# Patient Record
Sex: MALE | Race: WHITE | NOT HISPANIC OR LATINO | URBAN - METROPOLITAN AREA
[De-identification: names, ages, dates, MRNs, and addresses within clinical notes are randomized per-mention and may not be internally consistent; named-entity substitution may affect disease eponyms.]

---

## 2020-08-29 ENCOUNTER — INPATIENT (INPATIENT)
Facility: HOSPITAL | Age: 20
LOS: 10 days | Discharge: HOME | End: 2020-09-09
Attending: PSYCHIATRY & NEUROLOGY | Admitting: PSYCHIATRY & NEUROLOGY
Payer: COMMERCIAL

## 2020-08-29 VITALS
TEMPERATURE: 98 F | RESPIRATION RATE: 16 BRPM | HEART RATE: 78 BPM | OXYGEN SATURATION: 100 % | DIASTOLIC BLOOD PRESSURE: 81 MMHG | SYSTOLIC BLOOD PRESSURE: 129 MMHG

## 2020-08-29 DIAGNOSIS — F31.12 BIPOLAR DISORDER, CURRENT EPISODE MANIC WITHOUT PSYCHOTIC FEATURES, MODERATE: ICD-10-CM

## 2020-08-29 DIAGNOSIS — F31.13 BIPOLAR DISORDER, CURRENT EPISODE MANIC WITHOUT PSYCHOTIC FEATURES, SEVERE: ICD-10-CM

## 2020-08-29 LAB
ALBUMIN SERPL ELPH-MCNC: 4.6 G/DL — SIGNIFICANT CHANGE UP (ref 3.3–5)
ALP SERPL-CCNC: 74 U/L — SIGNIFICANT CHANGE UP (ref 40–120)
ALT FLD-CCNC: 38 U/L — SIGNIFICANT CHANGE UP (ref 4–41)
AMPHET UR-MCNC: NEGATIVE — SIGNIFICANT CHANGE UP
ANION GAP SERPL CALC-SCNC: 13 MMO/L — SIGNIFICANT CHANGE UP (ref 7–14)
APAP SERPL-MCNC: < 15 UG/ML — LOW (ref 15–25)
APPEARANCE UR: CLEAR — SIGNIFICANT CHANGE UP
AST SERPL-CCNC: 41 U/L — HIGH (ref 4–40)
BACTERIA # UR AUTO: HIGH
BARBITURATES UR SCN-MCNC: NEGATIVE — SIGNIFICANT CHANGE UP
BASOPHILS # BLD AUTO: 0.03 K/UL — SIGNIFICANT CHANGE UP (ref 0–0.2)
BASOPHILS NFR BLD AUTO: 0.5 % — SIGNIFICANT CHANGE UP (ref 0–2)
BENZODIAZ UR-MCNC: NEGATIVE — SIGNIFICANT CHANGE UP
BILIRUB SERPL-MCNC: 0.8 MG/DL — SIGNIFICANT CHANGE UP (ref 0.2–1.2)
BILIRUB UR-MCNC: NEGATIVE — SIGNIFICANT CHANGE UP
BLOOD UR QL VISUAL: NEGATIVE — SIGNIFICANT CHANGE UP
BUN SERPL-MCNC: 19 MG/DL — SIGNIFICANT CHANGE UP (ref 7–23)
CALCIUM SERPL-MCNC: 10.2 MG/DL — SIGNIFICANT CHANGE UP (ref 8.4–10.5)
CANNABINOIDS UR-MCNC: POSITIVE — SIGNIFICANT CHANGE UP
CHLORIDE SERPL-SCNC: 103 MMOL/L — SIGNIFICANT CHANGE UP (ref 98–107)
CO2 SERPL-SCNC: 22 MMOL/L — SIGNIFICANT CHANGE UP (ref 22–31)
COCAINE METAB.OTHER UR-MCNC: NEGATIVE — SIGNIFICANT CHANGE UP
COLOR SPEC: YELLOW — SIGNIFICANT CHANGE UP
CREAT SERPL-MCNC: 1.11 MG/DL — SIGNIFICANT CHANGE UP (ref 0.5–1.3)
EOSINOPHIL # BLD AUTO: 0.02 K/UL — SIGNIFICANT CHANGE UP (ref 0–0.5)
EOSINOPHIL NFR BLD AUTO: 0.3 % — SIGNIFICANT CHANGE UP (ref 0–6)
ETHANOL BLD-MCNC: < 10 MG/DL — SIGNIFICANT CHANGE UP
GLUCOSE SERPL-MCNC: 96 MG/DL — SIGNIFICANT CHANGE UP (ref 70–99)
GLUCOSE UR-MCNC: NEGATIVE — SIGNIFICANT CHANGE UP
HCT VFR BLD CALC: 46.7 % — SIGNIFICANT CHANGE UP (ref 39–50)
HGB BLD-MCNC: 15.8 G/DL — SIGNIFICANT CHANGE UP (ref 13–17)
IMM GRANULOCYTES NFR BLD AUTO: 0.3 % — SIGNIFICANT CHANGE UP (ref 0–1.5)
KETONES UR-MCNC: SIGNIFICANT CHANGE UP
LEUKOCYTE ESTERASE UR-ACNC: NEGATIVE — SIGNIFICANT CHANGE UP
LYMPHOCYTES # BLD AUTO: 1.71 K/UL — SIGNIFICANT CHANGE UP (ref 1–3.3)
LYMPHOCYTES # BLD AUTO: 27.9 % — SIGNIFICANT CHANGE UP (ref 13–44)
MCHC RBC-ENTMCNC: 29.8 PG — SIGNIFICANT CHANGE UP (ref 27–34)
MCHC RBC-ENTMCNC: 33.8 % — SIGNIFICANT CHANGE UP (ref 32–36)
MCV RBC AUTO: 88.1 FL — SIGNIFICANT CHANGE UP (ref 80–100)
METHADONE UR-MCNC: NEGATIVE — SIGNIFICANT CHANGE UP
MONOCYTES # BLD AUTO: 0.5 K/UL — SIGNIFICANT CHANGE UP (ref 0–0.9)
MONOCYTES NFR BLD AUTO: 8.2 % — SIGNIFICANT CHANGE UP (ref 2–14)
NEUTROPHILS # BLD AUTO: 3.84 K/UL — SIGNIFICANT CHANGE UP (ref 1.8–7.4)
NEUTROPHILS NFR BLD AUTO: 62.8 % — SIGNIFICANT CHANGE UP (ref 43–77)
NITRITE UR-MCNC: NEGATIVE — SIGNIFICANT CHANGE UP
NRBC # FLD: 0 K/UL — SIGNIFICANT CHANGE UP (ref 0–0)
OPIATES UR-MCNC: NEGATIVE — SIGNIFICANT CHANGE UP
OXYCODONE UR-MCNC: NEGATIVE — SIGNIFICANT CHANGE UP
PCP UR-MCNC: NEGATIVE — SIGNIFICANT CHANGE UP
PH UR: 6 — SIGNIFICANT CHANGE UP (ref 5–8)
PLATELET # BLD AUTO: 186 K/UL — SIGNIFICANT CHANGE UP (ref 150–400)
PMV BLD: 10.9 FL — SIGNIFICANT CHANGE UP (ref 7–13)
POTASSIUM SERPL-MCNC: 4.8 MMOL/L — SIGNIFICANT CHANGE UP (ref 3.5–5.3)
POTASSIUM SERPL-SCNC: 4.8 MMOL/L — SIGNIFICANT CHANGE UP (ref 3.5–5.3)
PROT SERPL-MCNC: 7.1 G/DL — SIGNIFICANT CHANGE UP (ref 6–8.3)
PROT UR-MCNC: SIGNIFICANT CHANGE UP
RBC # BLD: 5.3 M/UL — SIGNIFICANT CHANGE UP (ref 4.2–5.8)
RBC # FLD: 13.7 % — SIGNIFICANT CHANGE UP (ref 10.3–14.5)
RBC CASTS # UR COMP ASSIST: SIGNIFICANT CHANGE UP (ref 0–?)
SALICYLATES SERPL-MCNC: < 5 MG/DL — LOW (ref 15–30)
SARS-COV-2 RNA SPEC QL NAA+PROBE: SIGNIFICANT CHANGE UP
SODIUM SERPL-SCNC: 138 MMOL/L — SIGNIFICANT CHANGE UP (ref 135–145)
SP GR SPEC: 1.04 — SIGNIFICANT CHANGE UP (ref 1–1.04)
TSH SERPL-MCNC: 1.14 UIU/ML — SIGNIFICANT CHANGE UP (ref 0.27–4.2)
UROBILINOGEN FLD QL: SIGNIFICANT CHANGE UP
WBC # BLD: 6.12 K/UL — SIGNIFICANT CHANGE UP (ref 3.8–10.5)
WBC # FLD AUTO: 6.12 K/UL — SIGNIFICANT CHANGE UP (ref 3.8–10.5)
WBC UR QL: SIGNIFICANT CHANGE UP (ref 0–?)

## 2020-08-29 PROCEDURE — 99285 EMERGENCY DEPT VISIT HI MDM: CPT

## 2020-08-29 PROCEDURE — 70450 CT HEAD/BRAIN W/O DYE: CPT | Mod: 26

## 2020-08-29 RX ORDER — HYDROXYZINE HCL 10 MG
25 TABLET ORAL EVERY 6 HOURS
Refills: 0 | Status: DISCONTINUED | OUTPATIENT
Start: 2020-08-29 | End: 2020-09-09

## 2020-08-29 RX ORDER — CLONAZEPAM 1 MG
1 TABLET ORAL AT BEDTIME
Refills: 0 | Status: DISCONTINUED | OUTPATIENT
Start: 2020-08-29 | End: 2020-09-01

## 2020-08-29 RX ORDER — CLONAZEPAM 1 MG
1 TABLET ORAL AT BEDTIME
Refills: 0 | Status: DISCONTINUED | OUTPATIENT
Start: 2020-08-29 | End: 2020-08-29

## 2020-08-29 RX ORDER — HALOPERIDOL DECANOATE 100 MG/ML
5 INJECTION INTRAMUSCULAR ONCE
Refills: 0 | Status: DISCONTINUED | OUTPATIENT
Start: 2020-08-29 | End: 2020-08-31

## 2020-08-29 RX ORDER — LITHIUM CARBONATE 300 MG/1
600 TABLET, EXTENDED RELEASE ORAL AT BEDTIME
Refills: 0 | Status: DISCONTINUED | OUTPATIENT
Start: 2020-08-29 | End: 2020-09-03

## 2020-08-29 RX ORDER — DIPHENHYDRAMINE HCL 50 MG
50 CAPSULE ORAL EVERY 6 HOURS
Refills: 0 | Status: DISCONTINUED | OUTPATIENT
Start: 2020-08-29 | End: 2020-08-31

## 2020-08-29 RX ORDER — HALOPERIDOL DECANOATE 100 MG/ML
5 INJECTION INTRAMUSCULAR EVERY 6 HOURS
Refills: 0 | Status: DISCONTINUED | OUTPATIENT
Start: 2020-08-29 | End: 2020-08-31

## 2020-08-29 RX ORDER — ACETAMINOPHEN 500 MG
650 TABLET ORAL EVERY 6 HOURS
Refills: 0 | Status: DISCONTINUED | OUTPATIENT
Start: 2020-08-29 | End: 2020-09-09

## 2020-08-29 RX ORDER — DIPHENHYDRAMINE HCL 50 MG
50 CAPSULE ORAL ONCE
Refills: 0 | Status: DISCONTINUED | OUTPATIENT
Start: 2020-08-29 | End: 2020-08-31

## 2020-08-29 RX ADMIN — HALOPERIDOL DECANOATE 5 MILLIGRAM(S): 100 INJECTION INTRAMUSCULAR at 19:52

## 2020-08-29 RX ADMIN — LITHIUM CARBONATE 600 MILLIGRAM(S): 300 TABLET, EXTENDED RELEASE ORAL at 20:16

## 2020-08-29 RX ADMIN — Medication 2 MILLIGRAM(S): at 19:52

## 2020-08-29 RX ADMIN — Medication 50 MILLIGRAM(S): at 19:52

## 2020-08-29 NOTE — ED ADULT NURSE NOTE - NSIMPLEMENTINTERV_GEN_ALL_ED
Implemented All Fall with Harm Risk Interventions:  [place] to call system. Call bell, personal items and telephone within reach. Instruct patient to call for assistance. Room bathroom lighting operational. Non-slip footwear when patient is off stretcher. Physically safe environment: no spills, clutter or unnecessary equipment. Stretcher in lowest position, wheels locked, appropriate side rails in place. Provide visual cue, wrist band, yellow gown, etc. Monitor gait and stability. Monitor for mental status changes and reorient to person, place, and time. Review medications for side effects contributing to fall risk. Reinforce activity limits and safety measures with patient and family. Provide visual clues: red socks.

## 2020-08-29 NOTE — ED BEHAVIORAL HEALTH ASSESSMENT NOTE - DESCRIPTION (FIRST USE, LAST USE, QUANTITY, FREQUENCY, DURATION)
vapes nicotine nightly pt reports using alcohol since the age of 16 uses THC intermittently, last used one month ago pt reports using alcohol since the age of 16, drinks 3-4 drinks 3x per week, last used on vacation nearly one week ago

## 2020-08-29 NOTE — ED ADULT NURSE NOTE - OBJECTIVE STATEMENT
Received pt in  pt bought in for manic behaviour denies si/hi/avh presently safety & comfort measures maintained eval on going.

## 2020-08-29 NOTE — ED BEHAVIORAL HEALTH ASSESSMENT NOTE - RISK ASSESSMENT
Low Acute Suicide Risk Pt at elevated imminent risk of inadvertantly harming self/others at this time due to his active abdiel. He denies current SI/HI, but currently has grandiose, tangential thoughts leading to engaging in unusual goal-directed behavior with limited insight into need for immediate treatment of his condition. Pt has erratic sleep with changes in eating patterns with increased risk of impulsivity due to the nature of his abdiel. Pt needs involuntary hospitalization at this time for stabilization of his acute abdiel and heightened risk of harm. Release to an outpatient setting is not appropriate at this time as follow up for his acute abdiel could not be assured despite supportive and available family. Pt's chronic risk factors include his active and history of substance use, ongoing legal issues, his lack of insight into need for treatment, recent psychosocial stressors including family illness and family fight. Protective factors include lack of access to means, lack of previous psych history/hospitalization, lack of previous SI/HI, future orientation, engagement with school, employment/residential/relationship stability, supportive family.

## 2020-08-29 NOTE — ED PROVIDER NOTE - PHYSICAL EXAMINATION
ATTENDING PHYSICAL EXAM  GEN - NAD; well appearing; A+O x3  HEAD - NC/AT; EYES/NOSE - PERRL, EOMI, mucous membranes moist, no discharge; THROAT: Oral cavity and pharynx normal. No inflammation, swelling, exudate, or lesions  NECK: Neck supple, non-tender without lymphadenopathy, no masses, no JVD  PULMONARY - CTA b/l, symmetric breath sounds  CARDIAC -s1s2, RRR, no M,R,G  ABDOMEN - +BS, ND, NT, soft, no guarding, no rebound, no masses   BACK - no CVA tenderness, No vertebral or paravertebral tenderness  EXTREMITIES - symmetric pulses, 2+ dp, capillary refill < 2 seconds, no clubbing, no cyanosis, no edema  SKIN - no rash or bruising   NEUROLOGIC - alert, CN 2-12 intact, sensation nl, coordination nl, finger to nose nl, motor 5/5 RUE/LUE/RLE/LLE/EHL/Plantar flexion, no pronator drift, gait nl  PSYCH - Rapid speech with flight of ideas.

## 2020-08-29 NOTE — ED BEHAVIORAL HEALTH ASSESSMENT NOTE - DESCRIPTION
Pt took off clothes, did push ups in BH room but has been in behavioral control without need for emergent medication. VSS    Vital Signs Last 24 Hrs  T(C): 36.9 (29 Aug 2020 11:30), Max: 36.9 (29 Aug 2020 11:30)  T(F): 98.4 (29 Aug 2020 11:30), Max: 98.4 (29 Aug 2020 11:30)  HR: 78 (29 Aug 2020 11:30) (78 - 78)  BP: 129/81 (29 Aug 2020 11:30) (129/81 - 129/81)  BP(mean): --  RR: 16 (29 Aug 2020 11:30) (16 - 16)  SpO2: 100% (29 Aug 2020 11:30) (100% - 100%) denies works as a , currently dating his high school sweetcjrt, attends [employer]

## 2020-08-29 NOTE — ED BEHAVIORAL HEALTH ASSESSMENT NOTE - SUBSTANCE ISSUES AND PLAN (INCLUDE STANDING AND PRN MEDICATION)
pt with alcohol use, will start symptom triggered CIWA, folic acid and thiamine pt with intermittent alcohol use, last used one week ago, no CIWA necessary at this time

## 2020-08-29 NOTE — ED ADULT TRIAGE NOTE - CHIEF COMPLAINT QUOTE
mom reports pts " mind racing,asking same question repetitive.fit md to evaluate. luis a mansfield,. mom reports pts " mind racing,asking same question repetitive.fit md to evaluate. luis a mansfield,. pt speaking non stop at triage. states " hears mom  and boss calling me asshole'. . pt alert,oriented. last vaped 7/11. last alcohol 8/11

## 2020-08-29 NOTE — ED BEHAVIORAL HEALTH NOTE - BEHAVIORAL HEALTH NOTE
Pt unable to answer COVID screening questions due to tangential speech.    Collateral history obtained from patient's mother Malissa Mireles ([phone]) - Notes that the family came home from [facility] last week and since then the pt has been biking, playing sports and seemed "fine" until Wednesday. Mom reports that the patient added a liquid to his drink on Wednesday which the patient said was liquid caffeine and on Thursday, she and her  noticed that Faustino was "more hyper than usual." On Thursday, the patient complained that people at his job were accusing him of being on drugs and seemed upset about this. On Friday, Malissa noted that his thought process did not make sense, repeating himself and reminiscing about events from his childhood. Pt also ruminating about his DUI and probation status in addition to his recently ill grandmother while becoming increasingly tearful and emotional. She spoke with a family friend who is a therapist and mentioned that he sounded manic, so she attempted to take him to an urgent care that was closed before bringing him to the [facility] ER. No PMHx. Allergic to cefcil. No previous psych history/diagnosis/meds/hospitalization, but was mandated to substance use counseling in [place]. Denies recent drug use, utilized some alcohol on vacation. Denies current/past SI/HI. No access to weapons. Sleep erratic for the past week (approximately four hours per night with early awakening), believes he is eating less. ADLs intact. Works as . Pt's parents not amenable to hospitalization at this time, they were told prior to coming to the ED that he would only receive medication and they would like to take him home, saying they can monitor him at home 24/7. [facility] [place] [facility] provided to parents.    COVID Exposure Screen- Collateral (i.e. third-party, chart review, belongings, etc; include EMS and ED staff)    1.	*In the past 14 days, has the patient been around anyone with a positive COVID-19 test?*   (  ) Yes   ( x ) No   (  ) Unknown- Reason (e.g. collateral uncertain, refusing to answer, etc.):  ______  IF YES PROCEED TO QUESTION #2. IF NO or UNKNOWN, PLEASE SKIP TO QUESTION #7  2.	Was the patient within 6 feet of them for at least 15 minutes? (  ) Yes   (  ) No   (  ) Unknown- Reason: ______    3.	Did the patient provided care for them? (  ) Yes   (  ) No   (  ) Unknown- Reason: ______    4.	Did the patient have direct physical contact with them (touched, hugged, or kissed them)?   (  ) Yes   (  ) No    (  ) Unknown- Reason: ______    5.	Did the patient share eating or drinking utensils with them? (  ) Yes   (  ) No    (  ) Unknown- Reason: ______    6.	Have they sneezed, coughed, or somehow got respiratory droplets on the patient? (  ) Yes   (  ) No    (  ) Unknown- Reason: ______      7.	*Has the patient been out of [place] within the past 14 days?*  (  ) Yes   ( x ) No   (  ) Unknown- Reason (e.g. collateral uncertain, refusing to answer, etc.): _______  IF YES PLEASE ANSWER THE FOLLOWING QUESTIONS:  8.	Which state/country has the patient been to? ______   9.	Was the patient there over 24 hours? (  ) Yes   (  ) No    (  ) Unknown- Reason: ______    10.	What date did the patient return to [facility]? ______

## 2020-08-29 NOTE — ED BEHAVIORAL HEALTH ASSESSMENT NOTE - HPI (INCLUDE ILLNESS QUALITY, SEVERITY, DURATION, TIMING, CONTEXT, MODIFYING FACTORS, ASSOCIATED SIGNS AND SYMPTOMS)
Mr. Mireles is a 19 y/o man, domiciled with family, employed as a , no PPHx, no PMHx, susbtance use history significant for alcohol use (DUI in 2019), intermittent marijuana use, no trauma history who was BIB parents for several days of manic symptoms.    On interview, pt was initially sleeping, but then woke up and cooperated with the examination. He was frequently tangential and illogical in his answers to questioning, but was easy to interrupt and required constant redirection to obtain historical information. Pt states that two days ago, he was at home when he put a "green gummy bear" and a "white gummy bear" (which are his two favorite flavors) on each side of his cheek for "the flavor" and when he love the kitchen and ran into his parents, they accused him of being on cocaine and got into a heated argument. He states that they have been persistently accusing him of using drugs and alcohol for several weeks, which has led to arguments and precipitated him coming here so that they could be satisfied that he was ok. He notes that he has been "chasing the dream" for several weeks while attempting to train in multiple sports. 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 He denies changes in appetite and reports that he continues to work without difficulty, but admits that he feels as though his "mind is racing ahead of myself" which is something his brother experienced when his brother was younger. At this point, pt becomes tearful, stating his parents have been yelling constantly that he is doing drugs, although he denies frequent drug use. He states he has been hiding his "true, happy self" from them since he was 13 and just wishes that his entire family would know he is a happy person. Despite these statements, he reports that he feels he has been depressed every day since the age of 13, but does not go on to describe any persistent symptoms consistent with depression. He denies AH/VH, but believes music has messaging especially for him, going on a tangential train of thought about his favorite artist "[employer]" dying which upset him because when he plays sports, he can automatically sing many songs while playing, which he believes is because the music is made for him. Pt admits to brief counseling in school several months ago, but otherwise denies all other past psychiatric care. Pt denies current/past SI/HI and denies that any fights with family became physical. Pt unable to provide answers to COVID screening questions and when asked, becomes tangential, stating people he was with on vacation have gone with him on vacation for 20 years, since they were babies and that people with young kids may be exposed to covid due to their age. Pt believes he needs adderall for his current symptoms, but denies need for hospitalization at this time, stating he feels well but it is his family who thinks he is not well.    Please see BH note for collateral info.

## 2020-08-29 NOTE — ED BEHAVIORAL HEALTH ASSESSMENT NOTE - OTHER PAST PSYCHIATRIC HISTORY (INCLUDE DETAILS REGARDING ONSET, COURSE OF ILLNESS, INPATIENT/OUTPATIENT TREATMENT)
saw therapist for counseling for several months at college at end of last year. No other known previous diagnoses/treatments/hospitalizations/suicidality.

## 2020-08-29 NOTE — ED BEHAVIORAL HEALTH ASSESSMENT NOTE - NS ED BHA PLAN ADMIT TO PSYCHIATRY BH CONTACT YN
would be needed. I spent 15 minutes with the patient, over 50% of the time was spent towards patient counseling regarding the GeneSight results and possible causes of her anxiety. Return in about 6 months (around 7/2/2020) for Mood disorder. No

## 2020-08-29 NOTE — ED ADULT NURSE NOTE - CHIEF COMPLAINT QUOTE
mom reports pts " mind racing,asking same question repetitive.fit md to evaluate. luis a mansfield,. pt speaking non stop at triage. states " hears mom  and boss calling me asshole'. . pt alert,oriented. last vaped 7/11. last alcohol 8/11

## 2020-08-29 NOTE — ED BEHAVIORAL HEALTH ASSESSMENT NOTE - CASE SUMMARY
This is a 21 y/o man BIB family who is suffering from symptoms of acute abdiel for several days. Pt is labile with racing thoughts with tangential, flight of ideas with recent changes in sleep, as well as grandiose ideas of becoming a professional sports star leading to alterations in behavior.  Collateral suggests symptoms have been ongoing for 3 days, although pt indicates symptoms may have been ongoing for several weeks.  Pt does not show overt evidence of active psychosis as he denies AH/VH without overt delusions, but does reference some IOR in music he listens to, which may be a result of his heightened grandiose thinking.  The patient's active symptoms meet criteria for a current manic episode without psychotic features, which can be caused by bipolar disorder.  It is unclear at this time whether the patient's substance use is contributory to his acute abdiel, but pt's utox was positive for cannabinoids and pt admits to drinking approximately one week ago.  Although the patient is not actively suicidal or homicidal, his acute manic state with illogical thought process, puts patient at a heightened risk for impulsivity.  In the setting of his acute abdiel and lack of insight into need for stabilization at this time the patient requires involuntary hospitalization for his safety given the high risk the patient has for inadvertantly harming himself or others while acutely manic.  Admit to [address] on a 9.39 status, involuntary legal status.  No indication for constant observation in a locked, supervised setting.  Recommend EMS transport to unit with buckle guard for safety.

## 2020-08-29 NOTE — ED BEHAVIORAL HEALTH ASSESSMENT NOTE - SUICIDE PROTECTIVE FACTORS
Identifies reasons for living/Supportive social network of family or friends/Responsibility to family and others/Engaged in work or school

## 2020-08-29 NOTE — ED BEHAVIORAL HEALTH ASSESSMENT NOTE - SUMMARY
Mr. Mireles is a 21 y/o man BIB family who is suffering from symptoms of acute abdiel for several days. Pt is labile with racing thoughts with tangential, flight of idea speech with recent changes in sleep, as well as grandiose ideas of becoming a professional sports star leading to alterations in behavior. Collateral suggests symptoms have been ongoing for 3 days, although pt indicates symptoms may have been ongoing for several weeks. Pt does not show overt evidence of active psychosis as he denies AH/VH without overt delusions, but does reference some IOR in music he listens to, which may be a result of his heightened grandiose thinking. The patient's active symptoms meet criteria for a current manic episode without psychotic features, which can be caused by bipolar disorder. It is unclear at this time whether the patient's substance use is contributory to his acute abdiel, but pt's utox was positive for cannabnoids and pt admits to drinking approximately one week ago. Although the patient is not actively suicidal or homicidal, his acute manic state with illogical thought process, heightened risk for impulsivity in the setting of his acute abdiel and lack of insight into need for stabilization at this time necessitate the patient's involuntary hospitalization for his safety given the high risk the patient has for inadvertantly harming himself or others while actuely manic. Please see below for additional safety assessment.    Plan:  -Admit to [facility] on 9.39 status. Pt's family initially objecting, but extensive psychoed was provided regarding need for treatment/medication at this time. No 1:1 necessary, pt not expressing SI/HI at this time  -Will start mood stabilizer for patient's active abdiel. Pt not actively psychotic, will not consider antipsychotic medication at this time  -Klonopin 1 mg qHS prn for insomnia, additional 0.5 mg prn BID for day time anxiety  -Haldol 5mg/ativan 2 mg/benadryl 50 mg q6h prn PO/IM for acute agitation  -Medical: tylenol prns for pain  -Substance - pt with history of intermittent alcohol use, last drank over one week ago, pt without active symptoms of withdrawal, VSS, no need for CIWA at this time Mr. Mireles is a 21 y/o man BIB family who is suffering from symptoms of acute abdiel for several days. Pt is labile with racing thoughts with tangential, flight of idea speech with recent changes in sleep, as well as grandiose ideas of becoming a professional sports star leading to alterations in behavior. Collateral suggests symptoms have been ongoing for 3 days, although pt indicates symptoms may have been ongoing for several weeks. Pt does not show overt evidence of active psychosis as he denies AH/VH without overt delusions, but does reference some IOR in music he listens to, which may be a result of his heightened grandiose thinking. The patient's active symptoms meet criteria for a current manic episode without psychotic features, which can be caused by bipolar disorder. It is unclear at this time whether the patient's substance use is contributory to his acute abdiel, but pt's utox was positive for cannabnoids and pt admits to drinking approximately one week ago. Although the patient is not actively suicidal or homicidal, his acute manic state with illogical thought process, heightened risk for impulsivity in the setting of his acute abdiel and lack of insight into need for stabilization at this time necessitate the patient's involuntary hospitalization for his safety given the high risk the patient has for inadvertantly harming himself or others while actuely manic. Please see below for additional safety assessment.    Plan:  -Admit to [facility] [place] on 9.39 status. Pt's family initially objecting, but extensive psychoed was provided regarding need for treatment/medication at this time. No 1:1 necessary, pt not expressing SI/HI at this time  -Will start mood stabilizer for patient's active abdiel. Pt not actively psychotic, will not consider antipsychotic medication at this time  -Klonopin 1 mg qHS prn for insomnia, atarax 25 mg q6h prn PO for anxiety. Would recommend minimizing benzos when possible due to pt's substance use history.  -Haldol 5mg/ativan 2 mg/benadryl 50 mg q6h prn PO/IM for acute agitation  -Medical: tylenol prns for pain  -Substance - pt with history of intermittent alcohol use, last drank over one week ago, pt without active symptoms of withdrawal, VSS, no need for CIWA at this time Mr. Mireles is a 21 y/o man BIB family who is suffering from symptoms of acute abdiel for several days. Pt is labile with racing thoughts with tangential, flight of idea speech with recent changes in sleep, as well as grandiose ideas of becoming a professional sports star leading to alterations in behavior. Collateral suggests symptoms have been ongoing for 3 days, although pt indicates symptoms may have been ongoing for several weeks. Pt does not show overt evidence of active psychosis as he denies AH/VH without overt delusions, but does reference some IOR in music he listens to, which may be a result of his heightened grandiose thinking. The patient's active symptoms meet criteria for a current manic episode without psychotic features, which can be caused by bipolar disorder. It is unclear at this time whether the patient's substance use is contributory to his acute abdiel, but pt's utox was positive for cannabnoids and pt admits to drinking approximately one week ago. Although the patient is not actively suicidal or homicidal, his acute manic state with illogical thought process, heightened risk for impulsivity in the setting of his acute abdiel and lack of insight into need for stabilization at this time necessitate the patient's involuntary hospitalization for his safety given the high risk the patient has for inadvertantly harming himself or others while actuely manic. Please see below for additional safety assessment.    Plan:  -Admit to [facility] on 9.39 status. Pt's family initially objecting, but extensive psychoed was provided regarding need for treatment/medication at this time. No 1:1 necessary, pt not expressing SI/HI at this time  -Will initiate lithobid 600 mg qHS for mood stabilization. Pt not actively psychotic, will not consider antipsychotic medication at this time  -Klonopin 1 mg qHS prn for insomnia, atarax 25 mg q6h prn PO for anxiety. Would recommend minimizing benzos when possible due to pt's substance use history.  -Haldol 5mg/ativan 2 mg/benadryl 50 mg q6h prn PO/IM for acute agitation  -Medical: tylenol prns for pain  -Substance - pt with history of intermittent alcohol use, last drank over one week ago, pt without active symptoms of withdrawal, VSS, no need for CIWA at this time Mr. Mireles is a 21 y/o man BIB family who is suffering from symptoms of acute abdiel for several days. Pt is labile with racing thoughts with tangential, flight of idea speech with recent changes in sleep, as well as grandiose ideas of becoming a professional sports star leading to alterations in behavior. Collateral suggests symptoms have been ongoing for 3 days, although pt indicates symptoms may have been ongoing for several weeks. Pt does not show overt evidence of active psychosis as he denies AH/VH without overt delusions, but does reference some IOR in music he listens to, which may be a result of his heightened grandiose thinking. The patient's active symptoms meet criteria for a current manic episode without psychotic features, which can be caused by bipolar disorder. It is unclear at this time whether the patient's substance use is contributory to his acute abdiel, but pt's utox was positive for cannabinoids and pt admits to drinking approximately one week ago. Although the patient is not actively suicidal or homicidal, his acute manic state with illogical thought process, heightened risk for impulsivity in the setting of his acute abdiel and lack of insight into need for stabilization at this time necessitate the patient's involuntary hospitalization for his safety given the high risk the patient has for inadvertantly harming himself or others while acutely manic. Please see below for additional safety assessment.    Plan:  -Admit to [facility] [place] on 9.39 status. Pt's family initially objecting, but extensive psychoed was provided regarding need for treatment/medication at this time. No 1:1 necessary, pt not expressing SI/HI at this time  -Will initiate lithobid 600 mg qHS for mood stabilization. Pt not actively psychotic, will not consider antipsychotic medication at this time  -Klonopin 1 mg qHS prn for insomnia, atarax 25 mg q6h prn PO for anxiety. Would recommend minimizing benzos when possible due to pt's substance use history.  -Haldol 5mg/ativan 2 mg/benadryl 50 mg q6h prn PO/IM for acute agitation  -Medical: tylenol prns for pain  -Substance - pt with history of intermittent alcohol use, last drank over one week ago, pt without active symptoms of withdrawal, VSS, no need for CIWA at this time

## 2020-08-29 NOTE — ED PROVIDER NOTE - CLINICAL SUMMARY MEDICAL DECISION MAKING FREE TEXT BOX
21 y/o M with apparent  first manic episode.  Check labs, tox, TSH, ECG, head CT.  Psychiatrist tiffany.

## 2020-08-29 NOTE — ED BEHAVIORAL HEALTH ASSESSMENT NOTE - DETAILS
none known pt's brother with panic attacks parents informed, ED attending informed [facility] Dr. Klein informed

## 2020-08-30 PROCEDURE — 99222 1ST HOSP IP/OBS MODERATE 55: CPT

## 2020-08-30 RX ADMIN — Medication 2 MILLIGRAM(S): at 20:37

## 2020-08-30 RX ADMIN — LITHIUM CARBONATE 600 MILLIGRAM(S): 300 TABLET, EXTENDED RELEASE ORAL at 20:37

## 2020-08-30 RX ADMIN — HALOPERIDOL DECANOATE 5 MILLIGRAM(S): 100 INJECTION INTRAMUSCULAR at 08:33

## 2020-08-30 RX ADMIN — HALOPERIDOL DECANOATE 5 MILLIGRAM(S): 100 INJECTION INTRAMUSCULAR at 20:37

## 2020-08-30 RX ADMIN — Medication 50 MILLIGRAM(S): at 08:33

## 2020-08-30 RX ADMIN — Medication 50 MILLIGRAM(S): at 20:37

## 2020-08-30 RX ADMIN — Medication 2 MILLIGRAM(S): at 08:33

## 2020-08-31 LAB
SARS-COV-2 IGG SERPL IA-ACNC: 0.4 RATIO — SIGNIFICANT CHANGE UP
SARS-COV-2 IGG SERPL QL IA: NEGATIVE — SIGNIFICANT CHANGE UP
SARS-COV-2 IGG SERPL QL IA: NEGATIVE — SIGNIFICANT CHANGE UP
SARS-COV-2 IGM SERPL IA-ACNC: <0.2 RATIO — SIGNIFICANT CHANGE UP

## 2020-08-31 PROCEDURE — 99232 SBSQ HOSP IP/OBS MODERATE 35: CPT | Mod: GC

## 2020-08-31 RX ORDER — OLANZAPINE 15 MG/1
5 TABLET, FILM COATED ORAL ONCE
Refills: 0 | Status: DISCONTINUED | OUTPATIENT
Start: 2020-08-31 | End: 2020-09-09

## 2020-08-31 RX ORDER — OLANZAPINE 15 MG/1
5 TABLET, FILM COATED ORAL EVERY 6 HOURS
Refills: 0 | Status: DISCONTINUED | OUTPATIENT
Start: 2020-08-31 | End: 2020-09-09

## 2020-08-31 RX ADMIN — OLANZAPINE 5 MILLIGRAM(S): 15 TABLET, FILM COATED ORAL at 10:29

## 2020-08-31 RX ADMIN — OLANZAPINE 5 MILLIGRAM(S): 15 TABLET, FILM COATED ORAL at 17:16

## 2020-08-31 RX ADMIN — Medication 2 MILLIGRAM(S): at 20:13

## 2020-08-31 RX ADMIN — LITHIUM CARBONATE 600 MILLIGRAM(S): 300 TABLET, EXTENDED RELEASE ORAL at 22:26

## 2020-09-01 PROCEDURE — 99232 SBSQ HOSP IP/OBS MODERATE 35: CPT

## 2020-09-01 RX ORDER — OLANZAPINE 15 MG/1
5 TABLET, FILM COATED ORAL AT BEDTIME
Refills: 0 | Status: DISCONTINUED | OUTPATIENT
Start: 2020-09-01 | End: 2020-09-02

## 2020-09-01 RX ORDER — CLONAZEPAM 1 MG
1 TABLET ORAL AT BEDTIME
Refills: 0 | Status: DISCONTINUED | OUTPATIENT
Start: 2020-09-01 | End: 2020-09-08

## 2020-09-01 RX ORDER — NICOTINE POLACRILEX 2 MG
4 GUM BUCCAL
Refills: 0 | Status: DISCONTINUED | OUTPATIENT
Start: 2020-09-01 | End: 2020-09-09

## 2020-09-01 RX ADMIN — LITHIUM CARBONATE 600 MILLIGRAM(S): 300 TABLET, EXTENDED RELEASE ORAL at 21:04

## 2020-09-01 RX ADMIN — Medication 2 MILLIGRAM(S): at 20:53

## 2020-09-01 RX ADMIN — Medication 1 MILLIGRAM(S): at 21:55

## 2020-09-01 RX ADMIN — Medication 25 MILLIGRAM(S): at 21:55

## 2020-09-01 RX ADMIN — OLANZAPINE 5 MILLIGRAM(S): 15 TABLET, FILM COATED ORAL at 21:04

## 2020-09-02 PROCEDURE — 99232 SBSQ HOSP IP/OBS MODERATE 35: CPT

## 2020-09-02 RX ORDER — OLANZAPINE 15 MG/1
10 TABLET, FILM COATED ORAL AT BEDTIME
Refills: 0 | Status: DISCONTINUED | OUTPATIENT
Start: 2020-09-02 | End: 2020-09-03

## 2020-09-02 RX ADMIN — Medication 1 MILLIGRAM(S): at 20:40

## 2020-09-02 RX ADMIN — LITHIUM CARBONATE 600 MILLIGRAM(S): 300 TABLET, EXTENDED RELEASE ORAL at 20:40

## 2020-09-02 RX ADMIN — Medication 4 MILLIGRAM(S): at 10:00

## 2020-09-02 RX ADMIN — Medication 4 MILLIGRAM(S): at 12:10

## 2020-09-02 RX ADMIN — OLANZAPINE 10 MILLIGRAM(S): 15 TABLET, FILM COATED ORAL at 20:40

## 2020-09-03 LAB — LITHIUM SERPL-MCNC: 0.43 MMOL/L — LOW (ref 0.6–1.2)

## 2020-09-03 PROCEDURE — 99232 SBSQ HOSP IP/OBS MODERATE 35: CPT | Mod: GC

## 2020-09-03 RX ORDER — LITHIUM CARBONATE 300 MG/1
900 TABLET, EXTENDED RELEASE ORAL AT BEDTIME
Refills: 0 | Status: DISCONTINUED | OUTPATIENT
Start: 2020-09-03 | End: 2020-09-09

## 2020-09-03 RX ORDER — OLANZAPINE 15 MG/1
15 TABLET, FILM COATED ORAL AT BEDTIME
Refills: 0 | Status: DISCONTINUED | OUTPATIENT
Start: 2020-09-03 | End: 2020-09-09

## 2020-09-03 RX ADMIN — LITHIUM CARBONATE 900 MILLIGRAM(S): 300 TABLET, EXTENDED RELEASE ORAL at 20:51

## 2020-09-03 RX ADMIN — OLANZAPINE 5 MILLIGRAM(S): 15 TABLET, FILM COATED ORAL at 16:45

## 2020-09-03 RX ADMIN — Medication 2 MILLIGRAM(S): at 05:45

## 2020-09-03 RX ADMIN — OLANZAPINE 15 MILLIGRAM(S): 15 TABLET, FILM COATED ORAL at 20:50

## 2020-09-03 RX ADMIN — Medication 2 MILLIGRAM(S): at 20:50

## 2020-09-04 PROCEDURE — 99232 SBSQ HOSP IP/OBS MODERATE 35: CPT

## 2020-09-04 RX ADMIN — OLANZAPINE 15 MILLIGRAM(S): 15 TABLET, FILM COATED ORAL at 20:07

## 2020-09-04 RX ADMIN — Medication 1 MILLIGRAM(S): at 20:09

## 2020-09-04 RX ADMIN — Medication 4 MILLIGRAM(S): at 09:29

## 2020-09-04 RX ADMIN — Medication 2 MILLIGRAM(S): at 16:08

## 2020-09-04 RX ADMIN — LITHIUM CARBONATE 900 MILLIGRAM(S): 300 TABLET, EXTENDED RELEASE ORAL at 20:06

## 2020-09-05 RX ADMIN — LITHIUM CARBONATE 900 MILLIGRAM(S): 300 TABLET, EXTENDED RELEASE ORAL at 20:39

## 2020-09-05 RX ADMIN — Medication 4 MILLIGRAM(S): at 14:53

## 2020-09-05 RX ADMIN — Medication 1 MILLIGRAM(S): at 20:40

## 2020-09-05 RX ADMIN — OLANZAPINE 15 MILLIGRAM(S): 15 TABLET, FILM COATED ORAL at 20:39

## 2020-09-05 RX ADMIN — Medication 2 MILLIGRAM(S): at 12:35

## 2020-09-06 RX ADMIN — LITHIUM CARBONATE 900 MILLIGRAM(S): 300 TABLET, EXTENDED RELEASE ORAL at 20:57

## 2020-09-06 RX ADMIN — Medication 25 MILLIGRAM(S): at 12:56

## 2020-09-06 RX ADMIN — Medication 25 MILLIGRAM(S): at 22:00

## 2020-09-06 RX ADMIN — OLANZAPINE 15 MILLIGRAM(S): 15 TABLET, FILM COATED ORAL at 20:57

## 2020-09-07 RX ADMIN — Medication 25 MILLIGRAM(S): at 13:44

## 2020-09-07 RX ADMIN — Medication 1 MILLIGRAM(S): at 20:45

## 2020-09-07 RX ADMIN — OLANZAPINE 15 MILLIGRAM(S): 15 TABLET, FILM COATED ORAL at 20:45

## 2020-09-07 RX ADMIN — LITHIUM CARBONATE 900 MILLIGRAM(S): 300 TABLET, EXTENDED RELEASE ORAL at 20:45

## 2020-09-08 LAB
ANION GAP SERPL CALC-SCNC: 10 MMO/L — SIGNIFICANT CHANGE UP (ref 7–14)
BUN SERPL-MCNC: 19 MG/DL — SIGNIFICANT CHANGE UP (ref 7–23)
CALCIUM SERPL-MCNC: 9.8 MG/DL — SIGNIFICANT CHANGE UP (ref 8.4–10.5)
CHLORIDE SERPL-SCNC: 103 MMOL/L — SIGNIFICANT CHANGE UP (ref 98–107)
CO2 SERPL-SCNC: 27 MMOL/L — SIGNIFICANT CHANGE UP (ref 22–31)
CREAT SERPL-MCNC: 1.17 MG/DL — SIGNIFICANT CHANGE UP (ref 0.5–1.3)
GLUCOSE SERPL-MCNC: 58 MG/DL — LOW (ref 70–99)
LITHIUM SERPL-MCNC: 0.64 MMOL/L — SIGNIFICANT CHANGE UP (ref 0.6–1.2)
MAGNESIUM SERPL-MCNC: 1.8 MG/DL — SIGNIFICANT CHANGE UP (ref 1.6–2.6)
PHOSPHATE SERPL-MCNC: 3.2 MG/DL — SIGNIFICANT CHANGE UP (ref 2.5–4.5)
POTASSIUM SERPL-MCNC: 4.8 MMOL/L — SIGNIFICANT CHANGE UP (ref 3.5–5.3)
POTASSIUM SERPL-SCNC: 4.8 MMOL/L — SIGNIFICANT CHANGE UP (ref 3.5–5.3)
SODIUM SERPL-SCNC: 140 MMOL/L — SIGNIFICANT CHANGE UP (ref 135–145)

## 2020-09-08 PROCEDURE — 99232 SBSQ HOSP IP/OBS MODERATE 35: CPT

## 2020-09-08 RX ORDER — CLONAZEPAM 1 MG
1 TABLET ORAL AT BEDTIME
Refills: 0 | Status: DISCONTINUED | OUTPATIENT
Start: 2020-09-08 | End: 2020-09-09

## 2020-09-08 RX ADMIN — LITHIUM CARBONATE 900 MILLIGRAM(S): 300 TABLET, EXTENDED RELEASE ORAL at 20:53

## 2020-09-08 RX ADMIN — OLANZAPINE 15 MILLIGRAM(S): 15 TABLET, FILM COATED ORAL at 20:53

## 2020-09-09 VITALS — TEMPERATURE: 98 F | DIASTOLIC BLOOD PRESSURE: 74 MMHG | SYSTOLIC BLOOD PRESSURE: 136 MMHG | HEART RATE: 86 BPM

## 2020-09-09 PROCEDURE — 99239 HOSP IP/OBS DSCHRG MGMT >30: CPT | Mod: GC

## 2020-09-09 RX ORDER — OLANZAPINE 15 MG/1
1 TABLET, FILM COATED ORAL
Qty: 30 | Refills: 0
Start: 2020-09-09 | End: 2020-10-08

## 2020-09-09 RX ORDER — LITHIUM CARBONATE 300 MG/1
2 TABLET, EXTENDED RELEASE ORAL
Qty: 60 | Refills: 0
Start: 2020-09-09 | End: 2020-10-08

## 2020-09-09 RX ADMIN — Medication 4 MILLIGRAM(S): at 10:06

## 2021-04-22 ENCOUNTER — TRANSCRIPTION ENCOUNTER (OUTPATIENT)
Age: 21
End: 2021-04-22

## 2022-02-14 NOTE — ED ADULT NURSE NOTE - NSFALLRSKASSESSDT_ED_ALL_ED
Outpatient Medications Marked as Taking for the 2/14/22 encounter (Refill) with Candido Walt, DO   Medication Sig Dispense Refill   â¢ metformin (GLUCOPHAGE) 1000 MG tablet Take 1 tablet by mouth 2 times daily (with meals). 180 tablet 0        Ok to leave detailed Message: Yes  Informed caller of refill policy- 18-08 hours: Yes  No call back needed unless nurse has questions.      Pharmacy:            [address] [place] [address] [id number] A [phone] 29-Aug-2020 12:35

## 2023-10-11 NOTE — ED PROVIDER NOTE - OBJECTIVE STATEMENT
19 y/o M BIB mother.  Per mother, patient has been 'rambling' with his speech x 2 days.  Often repeating himself.  Sometimes confused.  Decreased sleep, only about 4 hours per night.  Has been "hyper" and "supercharged" and running from activity to activity, playing sports all day.  While he is very active at baseline and plays many sports, he has never been so 'hyper' as in past couple days per mother.  He is student at [employer], and pitches on baseball team, but has been expressing grandiose delusions about pitching for the best teams.  Patient seen by therapist friend and spoke to pediatrician, who feel he may be having a manic episodes, and referred to ED.  No previous psychiatric history.  No drug use.  Returned from vacation to [place] 1 week ago and may have drank alcohol on vacation.  No report of more recent alcohol.  Currently on probation for DUI in past.  No recent fever, cough, covid dx.  No head trauma or other trauma.  Mother is Malissa Mireles [phone]. 19 y/o M BIB mother.  Per mother, patient has been 'rambling' with his speech x 2 days.  Often repeating himself.  Sometimes confused.  Decreased sleep, only about 4 hours per night.  Has been "hyper" and "supercharged" and running from activity to activity, playing sports all day.  While he is very active at baseline and plays many sports, he has never been so 'hyper' as in past couple days per mother.  He is student at [employer] and pitches on baseball team, but has been expressing grandiose delusions about pitching for the best teams.  Patient seen by therapist friend and spoke to pediatrician, who feel he may be having a manic episodes, and referred to ED.  No previous psychiatric history.  No drug use.  Returned from vacation to [place] 1 week ago and may have drank alcohol on vacation.  No report of more recent alcohol.  Currently on probation for DUI in past.  No recent fever, cough, covid dx.  No head trauma or other trauma.  Mother is Malissa Mireles [phone].  Denies AH/VH.  Denies SI/HI.  + stressors of grandmother recently diagnosed with cancer, and DUI probation extended due to covid, per patient. Monthly or less
